# Patient Record
Sex: FEMALE | Employment: OTHER | URBAN - METROPOLITAN AREA
[De-identification: names, ages, dates, MRNs, and addresses within clinical notes are randomized per-mention and may not be internally consistent; named-entity substitution may affect disease eponyms.]

---

## 2018-02-12 ENCOUNTER — LAB ENCOUNTER (OUTPATIENT)
Dept: LAB | Facility: HOSPITAL | Age: 23
End: 2018-02-12
Attending: CLINICAL NURSE SPECIALIST
Payer: COMMERCIAL

## 2018-02-12 ENCOUNTER — OFFICE VISIT (OUTPATIENT)
Dept: OBGYN CLINIC | Facility: CLINIC | Age: 23
End: 2018-02-12

## 2018-02-12 VITALS
HEART RATE: 44 BPM | WEIGHT: 148.19 LBS | BODY MASS INDEX: 23.26 KG/M2 | HEIGHT: 67 IN | DIASTOLIC BLOOD PRESSURE: 78 MMHG | SYSTOLIC BLOOD PRESSURE: 122 MMHG

## 2018-02-12 DIAGNOSIS — Z76.0 MEDICATION REFILL: ICD-10-CM

## 2018-02-12 DIAGNOSIS — Z11.3 SCREENING FOR STD (SEXUALLY TRANSMITTED DISEASE): ICD-10-CM

## 2018-02-12 DIAGNOSIS — Z01.419 WELL WOMAN EXAM WITH ROUTINE GYNECOLOGICAL EXAM: Primary | ICD-10-CM

## 2018-02-12 DIAGNOSIS — Z12.4 SCREENING FOR MALIGNANT NEOPLASM OF CERVIX: ICD-10-CM

## 2018-02-12 DIAGNOSIS — N76.0 VAGINITIS AND VULVOVAGINITIS: ICD-10-CM

## 2018-02-12 LAB
HBV SURFACE AG SERPL QL IA: NONREACTIVE
HCV AB SERPL QL IA: NONREACTIVE
HIV1+2 AB SERPL QL IA: NONREACTIVE
T PALLIDUM AB SER QL: NEGATIVE

## 2018-02-12 PROCEDURE — 87389 HIV-1 AG W/HIV-1&-2 AB AG IA: CPT

## 2018-02-12 PROCEDURE — 86803 HEPATITIS C AB TEST: CPT

## 2018-02-12 PROCEDURE — 99385 PREV VISIT NEW AGE 18-39: CPT | Performed by: CLINICAL NURSE SPECIALIST

## 2018-02-12 PROCEDURE — 86780 TREPONEMA PALLIDUM: CPT

## 2018-02-12 PROCEDURE — 36415 COLL VENOUS BLD VENIPUNCTURE: CPT

## 2018-02-12 PROCEDURE — 87340 HEPATITIS B SURFACE AG IA: CPT

## 2018-02-12 RX ORDER — LEVONORGESTREL AND ETHINYL ESTRADIOL 0.1-0.02MG
1 KIT ORAL DAILY
Qty: 28 TABLET | Refills: 12 | Status: SHIPPED | OUTPATIENT
Start: 2018-02-12 | End: 2018-02-12

## 2018-02-12 RX ORDER — LEVONORGESTREL AND ETHINYL ESTRADIOL 0.1-0.02MG
KIT ORAL
Qty: 84 TABLET | Refills: 3 | Status: SHIPPED | OUTPATIENT
Start: 2018-02-12 | End: 2018-07-02

## 2018-02-12 RX ORDER — FLUCONAZOLE 150 MG/1
150 TABLET ORAL ONCE
Qty: 1 TABLET | Refills: 1 | Status: SHIPPED | OUTPATIENT
Start: 2018-02-12 | End: 2018-02-12

## 2018-02-12 NOTE — PROGRESS NOTES
Michelle Rangel is a 25year old female  Patient's last menstrual period was 2018. Patient presents with:  Gyn Exam: Annual  New patient. Last annual exam was last November. Unsure if she has ever had a pap. Will do one today.  Periods are regula Known Allergies      Review of Systems:  Constitutional:  Denies fatigue, night sweats, hot flashes  Eyes:  denies blurred or double vision  Cardiovascular:  denies chest pain or palpitations  Respiratory:  denies shortness of breath  Gastrointestinal:  de exam    Screening for STD (sexually transmitted disease)  -     CHLAMYDIA/GONOCOCCUS, EUFEMIA; Future  -     GENITAL VAGINOSIS SCREEN; Future  -     HIV AG AB COMBO; Future  -     HEPATITIS B SURFACE ANTIGEN; Future  -     HCV ANTIBODY;  Future  -     T PALLIDU

## 2018-02-13 LAB
C TRACH DNA SPEC QL NAA+PROBE: NEGATIVE
N GONORRHOEA DNA SPEC QL NAA+PROBE: NEGATIVE

## 2018-02-15 LAB
GENITAL VAGINOSIS SCREEN: NEGATIVE
TRICHOMONAS SCREEN: NEGATIVE

## 2018-02-20 ENCOUNTER — TELEPHONE (OUTPATIENT)
Dept: OBGYN CLINIC | Facility: CLINIC | Age: 23
End: 2018-02-20

## 2018-02-20 NOTE — TELEPHONE ENCOUNTER
Pt states that she was talking to her friends about OCP's and her friends told her that there is a pill that \"enhances breasts\". Pt states she thinks the pill is called alison. Pt advised will send to [facility] for recommendations.

## 2018-02-21 NOTE — TELEPHONE ENCOUNTER
This is very common since its technically a different pill (same dose and hormonal makeup of the microgestin but a different ). She also started the pill a day late and had to double up, which could cause BTB.  If it continues after 3 months, we

## 2018-02-21 NOTE — TELEPHONE ENCOUNTER
Informed pt of [facility] recs below. Pt will continue taking the pill po every day and same time every day. Pt will call if the BTB continues after 3 months.

## 2018-02-21 NOTE — TELEPHONE ENCOUNTER
Informed pt that JOAQUIN stated all pills/hormones can cause some breast growth, but it does not happen to all women. Informed pt that JOAQUIN stated this is not a reason to change pills.  Informed pt that JOAQUIN rec that she stay on her current pill, since she has d

## 2018-02-21 NOTE — TELEPHONE ENCOUNTER
All pills/hormones can cause some breast growth but it does not happen to all women. This is not a reason to change pills. Id recommend she stay on her current pill since she has done well with it.        MAF

## 2018-06-26 ENCOUNTER — TELEPHONE (OUTPATIENT)
Dept: OBGYN CLINIC | Facility: CLINIC | Age: 23
End: 2018-06-26

## 2018-06-26 NOTE — TELEPHONE ENCOUNTER
Pt states she has been taking the birth control pill Aviane since 2/2018. Pt states that she takes the pill every day and the same time every day. Pt states she has had btb and still continues with it. Pt states that she would like to start the birth control pill that she use to be on, which was Microgestin the three week packet. Sent to [facility]. (Pt made aware that MAF will be back next week.   She stated that she will wait for MAF recs when she returns.)

## 2018-07-02 RX ORDER — NORETHINDRONE ACETATE AND ETHINYL ESTRADIOL 1; .02 MG/1; MG/1
1 TABLET ORAL DAILY
Qty: 1 PACKAGE | Refills: 7 | Status: SHIPPED | OUTPATIENT
Start: 2018-07-02 | End: 2019-10-31

## 2019-01-14 ENCOUNTER — OFFICE VISIT (OUTPATIENT)
Dept: OBGYN CLINIC | Facility: CLINIC | Age: 24
End: 2019-01-14
Payer: COMMERCIAL

## 2019-01-14 ENCOUNTER — APPOINTMENT (OUTPATIENT)
Dept: LAB | Facility: HOSPITAL | Age: 24
End: 2019-01-14
Attending: CLINICAL NURSE SPECIALIST
Payer: COMMERCIAL

## 2019-01-14 VITALS
DIASTOLIC BLOOD PRESSURE: 65 MMHG | SYSTOLIC BLOOD PRESSURE: 115 MMHG | BODY MASS INDEX: 24 KG/M2 | WEIGHT: 152 LBS | HEART RATE: 59 BPM

## 2019-01-14 DIAGNOSIS — Z11.3 SCREENING FOR STD (SEXUALLY TRANSMITTED DISEASE): Primary | ICD-10-CM

## 2019-01-14 DIAGNOSIS — Z11.3 SCREENING FOR STD (SEXUALLY TRANSMITTED DISEASE): ICD-10-CM

## 2019-01-14 PROCEDURE — 87340 HEPATITIS B SURFACE AG IA: CPT

## 2019-01-14 PROCEDURE — 99213 OFFICE O/P EST LOW 20 MIN: CPT | Performed by: CLINICAL NURSE SPECIALIST

## 2019-01-14 PROCEDURE — 86780 TREPONEMA PALLIDUM: CPT

## 2019-01-14 PROCEDURE — 87389 HIV-1 AG W/HIV-1&-2 AB AG IA: CPT

## 2019-01-14 PROCEDURE — 36415 COLL VENOUS BLD VENIPUNCTURE: CPT

## 2019-01-14 PROCEDURE — 86803 HEPATITIS C AB TEST: CPT

## 2019-01-14 PROCEDURE — 87491 CHLMYD TRACH DNA AMP PROBE: CPT | Performed by: CLINICAL NURSE SPECIALIST

## 2019-01-14 PROCEDURE — 87661 TRICHOMONAS VAGINALIS AMPLIF: CPT | Performed by: CLINICAL NURSE SPECIALIST

## 2019-01-14 PROCEDURE — 87591 N.GONORRHOEAE DNA AMP PROB: CPT | Performed by: CLINICAL NURSE SPECIALIST

## 2019-01-14 NOTE — PROGRESS NOTES
Michelle Rangel is a 26year old female  Patient's last menstrual period was 2018 (approximate). Patient presents with:  Gyn Exam: pt states her partner tested positive for STDs.    Partner she had 2 months ago called last week saying he has Chla diarrhea or constipation  Genitourinary:  denies dysuria, incontinence, abnormal vaginal discharge, vaginal itching        PHYSICAL EXAM:   Constitutional: well developed, well nourished  Head/Face: normocephalic  Psychiatric:  Oriented to time, place, per

## 2019-01-15 LAB
C TRACH DNA SPEC QL NAA+PROBE: NEGATIVE
HBV SURFACE AG SERPL QL IA: NONREACTIVE
HCV AB SERPL QL IA: NONREACTIVE
HIV1+2 AB SERPL QL IA: NONREACTIVE
N GONORRHOEA DNA SPEC QL NAA+PROBE: NEGATIVE

## 2019-01-16 LAB
T PALLIDUM AB SER QL: NEGATIVE
T VAGINALIS RRNA SPEC QL NAA+PROBE: NEGATIVE

## 2019-01-17 ENCOUNTER — TELEPHONE (OUTPATIENT)
Dept: OBGYN CLINIC | Facility: CLINIC | Age: 24
End: 2019-01-17

## 2019-01-17 NOTE — TELEPHONE ENCOUNTER
Informed pt MAF has not reviewed results yet but all lab results and cx are negative. Pt verbalized understanding.

## 2019-01-18 ENCOUNTER — TELEPHONE (OUTPATIENT)
Dept: OBGYN CLINIC | Facility: CLINIC | Age: 24
End: 2019-01-18

## 2019-01-18 NOTE — TELEPHONE ENCOUNTER
----- Message from CORA Escboedo sent at 1/17/2019  5:58 PM CST -----  Please let pt know all STD testing is negative.     MAF

## 2019-10-16 RX ORDER — NORETHINDRONE ACETATE AND ETHINYL ESTRADIOL 1; .02 MG/1; MG/1
1 TABLET ORAL DAILY
Qty: 63 TABLET | Refills: 0 | OUTPATIENT
Start: 2019-10-16

## 2019-10-25 RX ORDER — NORETHINDRONE ACETATE AND ETHINYL ESTRADIOL 1; .02 MG/1; MG/1
1 TABLET ORAL DAILY
Qty: 63 TABLET | Refills: 0 | OUTPATIENT
Start: 2019-10-25

## 2019-10-25 NOTE — TELEPHONE ENCOUNTER
Pt's last annual was 2/12/18 with normal pap. No future annual exam scheduled. Pt informed she is over a year late for her annual exam.Appt made on 10/31 with [facility]. Pt informed she will have to wait for that appt to receive any refills.

## 2019-10-31 ENCOUNTER — LAB ENCOUNTER (OUTPATIENT)
Dept: LAB | Facility: HOSPITAL | Age: 24
End: 2019-10-31
Attending: CLINICAL NURSE SPECIALIST
Payer: COMMERCIAL

## 2019-10-31 ENCOUNTER — OFFICE VISIT (OUTPATIENT)
Dept: OBGYN CLINIC | Facility: CLINIC | Age: 24
End: 2019-10-31
Payer: COMMERCIAL

## 2019-10-31 VITALS
SYSTOLIC BLOOD PRESSURE: 109 MMHG | WEIGHT: 149.38 LBS | HEIGHT: 67 IN | DIASTOLIC BLOOD PRESSURE: 68 MMHG | HEART RATE: 54 BPM | BODY MASS INDEX: 23.45 KG/M2

## 2019-10-31 DIAGNOSIS — Z11.3 SCREENING FOR STD (SEXUALLY TRANSMITTED DISEASE): ICD-10-CM

## 2019-10-31 DIAGNOSIS — Z76.0 MEDICATION REFILL: ICD-10-CM

## 2019-10-31 DIAGNOSIS — N89.8 VAGINAL DISCHARGE: ICD-10-CM

## 2019-10-31 DIAGNOSIS — N92.6 IRREGULAR PERIODS: ICD-10-CM

## 2019-10-31 DIAGNOSIS — Z01.419 WELL WOMAN EXAM WITH ROUTINE GYNECOLOGICAL EXAM: Primary | ICD-10-CM

## 2019-10-31 PROCEDURE — 36415 COLL VENOUS BLD VENIPUNCTURE: CPT

## 2019-10-31 PROCEDURE — 87389 HIV-1 AG W/HIV-1&-2 AB AG IA: CPT

## 2019-10-31 PROCEDURE — 86780 TREPONEMA PALLIDUM: CPT

## 2019-10-31 PROCEDURE — 99395 PREV VISIT EST AGE 18-39: CPT | Performed by: CLINICAL NURSE SPECIALIST

## 2019-10-31 PROCEDURE — 86803 HEPATITIS C AB TEST: CPT

## 2019-10-31 PROCEDURE — 87340 HEPATITIS B SURFACE AG IA: CPT

## 2019-10-31 PROCEDURE — 84703 CHORIONIC GONADOTROPIN ASSAY: CPT

## 2019-10-31 PROCEDURE — 84443 ASSAY THYROID STIM HORMONE: CPT

## 2019-10-31 RX ORDER — NORETHINDRONE ACETATE AND ETHINYL ESTRADIOL 1; .02 MG/1; MG/1
1 TABLET ORAL DAILY
Qty: 1 PACKAGE | Refills: 13 | Status: SHIPPED | OUTPATIENT
Start: 2019-10-31

## 2019-10-31 NOTE — PROGRESS NOTES
Lyndsey Orellana is a 26year old female  Patient's last menstrual period was 10/25/2019. Patient presents with:  Gyn Exam: annual exam  Medication Request: OCP  Last annual exam and pap was last year.  Pap was normal. Periods are regular with OCP and w Minutes per session: Not on file      Stress: Not on file    Relationships      Social connections:        Talks on phone: Not on file        Gets together: Not on file        Attends [employer] service: Not on file        Active member of club or [place] no nodules, no adenopathy  Lymphatic:no abnormal supraclavicular or axillary adenopathy is noted  Breast: normal without palpable masses, tenderness, asymmetry, nipple discharge, nipple retraction or skin changes  Abdomen:  soft, nontender, nondistended, n restart OCP on Sunday (Rx sent). Aware she needs to use backup x 1 month. Exercise and healthy lifestyle discussed  Refill rx for OCP sent  Monthly self breast exams.    Yearly exams encouraged

## 2019-11-01 ENCOUNTER — TELEPHONE (OUTPATIENT)
Dept: OBGYN CLINIC | Facility: CLINIC | Age: 24
End: 2019-11-01

## 2019-11-01 NOTE — TELEPHONE ENCOUNTER
Received call from lab. Pt is + for GC. Routed to [facility] to please review.

## 2019-11-04 RX ORDER — FLUCONAZOLE 150 MG/1
150 TABLET ORAL SEE ADMIN INSTRUCTIONS
Qty: 2 TABLET | Refills: 0 | Status: SHIPPED | OUTPATIENT
Start: 2019-11-04 | End: 2019-12-05

## 2019-11-04 RX ORDER — AZITHROMYCIN 500 MG/1
TABLET, FILM COATED ORAL
Qty: 2 TABLET | Refills: 0 | Status: SHIPPED | OUTPATIENT
Start: 2019-11-04 | End: 2019-12-05

## 2019-11-04 NOTE — TELEPHONE ENCOUNTER
----- Message from CORA Crum sent at 11/4/2019  6:46 AM CST -----  Please let pt know she is + for gonorrhea as well as some yeast. She needs to come in for Ceftriaxone 250 mg IM x 1 and needs to bring with her the Rx for Zithromax 1 gm po x 1 th

## 2019-11-04 NOTE — TELEPHONE ENCOUNTER
Informed pt of results and MAF recs below. Assisted pt with scheduling appt for 11/5/19 at 1pm. Pt aware to bring Zithromax to appt tomorrow. Pt states after appt tomorrow pt will schedule STEFANIE appt. All Rxs ordered and sent to pt pharmacy.  Pt verbalized un

## 2019-11-05 ENCOUNTER — NURSE ONLY (OUTPATIENT)
Dept: OBGYN CLINIC | Facility: CLINIC | Age: 24
End: 2019-11-05
Payer: COMMERCIAL

## 2019-11-05 DIAGNOSIS — A54.9 GONORRHEA: Primary | ICD-10-CM

## 2019-11-05 PROCEDURE — 96372 THER/PROPH/DIAG INJ SC/IM: CPT | Performed by: CLINICAL NURSE SPECIALIST

## 2019-11-05 RX ORDER — CEFTRIAXONE SODIUM 250 MG/1
250 INJECTION, POWDER, FOR SOLUTION INTRAMUSCULAR; INTRAVENOUS ONCE
Status: COMPLETED | OUTPATIENT
Start: 2019-11-05 | End: 2019-11-05

## 2019-11-05 RX ADMIN — CEFTRIAXONE SODIUM 250 MG: 250 INJECTION, POWDER, FOR SOLUTION INTRAMUSCULAR; INTRAVENOUS at 13:39:00

## 2019-11-05 NOTE — PROGRESS NOTES
PT HERE TODAY FOR ROCEPHIN 250 MG INJECTION / [facility] 1 GRAM TABLET. PT TOLERATED WELL. PT NEEDS TO RETURN TO THE OFFICE BETWEEN 4 TO 6 WEEKS TO FOLLOW UP WITH MD.

## 2019-12-09 ENCOUNTER — OFFICE VISIT (OUTPATIENT)
Dept: OBGYN CLINIC | Facility: CLINIC | Age: 24
End: 2019-12-09
Payer: COMMERCIAL

## 2019-12-09 VITALS
HEART RATE: 56 BPM | BODY MASS INDEX: 23 KG/M2 | DIASTOLIC BLOOD PRESSURE: 74 MMHG | WEIGHT: 149 LBS | SYSTOLIC BLOOD PRESSURE: 119 MMHG

## 2019-12-09 DIAGNOSIS — Z20.2 EXPOSURE TO STD: Primary | ICD-10-CM

## 2019-12-09 PROCEDURE — 99213 OFFICE O/P EST LOW 20 MIN: CPT | Performed by: CLINICAL NURSE SPECIALIST

## 2019-12-09 NOTE — PROGRESS NOTES
Violetta Frankel is a 22year old female  Patient's last menstrual period was 2019. Patient presents with:  Gyn Exam: Rocephine f/u   tested + for gonorrhea on 10/31/19. Came in for Rocephin injection and took Zithromax at same time on 19.  H Active member of club or organization: Not on file        Attends meetings of clubs or organizations: Not on file        Relationship status: Not on file      Intimate partner violence:        Fear of current or ex partner: Not on file        Emotionally a

## 2019-12-12 ENCOUNTER — TELEPHONE (OUTPATIENT)
Dept: OBGYN CLINIC | Facility: CLINIC | Age: 24
End: 2019-12-12

## 2019-12-12 NOTE — TELEPHONE ENCOUNTER
----- Message from CORA Malloy sent at 12/11/2019  3:14 PM CST -----  Please let pt know STD testing is negative.      MAF

## 2020-01-15 ENCOUNTER — TELEPHONE (OUTPATIENT)
Dept: OBGYN CLINIC | Facility: CLINIC | Age: 25
End: 2020-01-15
